# Patient Record
Sex: MALE | Race: WHITE | NOT HISPANIC OR LATINO | Employment: FULL TIME | URBAN - METROPOLITAN AREA
[De-identification: names, ages, dates, MRNs, and addresses within clinical notes are randomized per-mention and may not be internally consistent; named-entity substitution may affect disease eponyms.]

---

## 2017-08-24 ENCOUNTER — PATIENT OUTREACH (OUTPATIENT)
Dept: ADMINISTRATIVE | Facility: HOSPITAL | Age: 57
End: 2017-08-24

## 2017-08-24 NOTE — PROGRESS NOTES
Attempted to contact patient regarding scheduling for physical. No answer. No voice mail or unable to leave a voice mail message.  Letter mailed to patient.

## 2017-08-24 NOTE — LETTER
August 24, 2017    Stella Gonzalez  [address]  [address]  [place] [address]             [facility]  [address]  [facility] [address]  Phone: [phone]    Dear Stella Gonzalez     In the spirit of maintaining your good health, our system indicates that you have not been seen in the office in over 12 months and due for a visit.     Our system indicates that you are due for the following:    Hepatitis C Screening  1960    Lipid Panel  1960    Foot Exam  08/08/1971    Hemoglobin A1c  10/23/2015    Eye Exam  04/29/2016    Influenza Vaccine  08/01/2017       Doreen Mendez MD would like you to schedule an appointment for an annual exam at your earliest convenience. If you have completed any of these health maintenance requirements at an outside facility, please contact our office so we may update your health record.    If your PRIMARY CARE PHYSICIAN has changed please contact your insurance company to reflect the correct physician.    If you have any issues or need assistance in scheduling this appointment, please call the appointment desk [phone] or call the number below.      Chris AVITIA LPN Care Coordinator  Care Coordination Department  [facility]  [phone]